# Patient Record
Sex: FEMALE | Employment: UNEMPLOYED | URBAN - METROPOLITAN AREA
[De-identification: names, ages, dates, MRNs, and addresses within clinical notes are randomized per-mention and may not be internally consistent; named-entity substitution may affect disease eponyms.]

---

## 2018-05-24 ENCOUNTER — TRANSFERRED RECORDS (OUTPATIENT)
Dept: HEALTH INFORMATION MANAGEMENT | Facility: CLINIC | Age: 43
End: 2018-05-24

## 2018-05-24 LAB
C TRACH DNA SPEC QL PROBE+SIG AMP: NEGATIVE
HPV ABSTRACT: NORMAL
N GONORRHOEA DNA SPEC QL PROBE+SIG AMP: NEGATIVE
PAP-ABSTRACT: NORMAL
SPECIMEN DESCRIP: NORMAL
SPECIMEN DESCRIPTION: NORMAL

## 2019-03-28 ENCOUNTER — APPOINTMENT (OUTPATIENT)
Dept: MRI IMAGING | Facility: CLINIC | Age: 44
End: 2019-03-28
Attending: EMERGENCY MEDICINE
Payer: COMMERCIAL

## 2019-03-28 ENCOUNTER — HOSPITAL ENCOUNTER (EMERGENCY)
Facility: CLINIC | Age: 44
Discharge: HOME | End: 2019-03-28
Attending: EMERGENCY MEDICINE | Admitting: EMERGENCY MEDICINE
Payer: COMMERCIAL

## 2019-03-28 VITALS
BODY MASS INDEX: 26.68 KG/M2 | HEART RATE: 81 BPM | SYSTOLIC BLOOD PRESSURE: 148 MMHG | WEIGHT: 145 LBS | RESPIRATION RATE: 16 BRPM | OXYGEN SATURATION: 99 % | TEMPERATURE: 98.8 F | HEIGHT: 62 IN | DIASTOLIC BLOOD PRESSURE: 95 MMHG

## 2019-03-28 DIAGNOSIS — G44.89 OTHER HEADACHE SYNDROME: ICD-10-CM

## 2019-03-28 LAB
ALBUMIN UR-MCNC: NEGATIVE MG/DL
ANION GAP SERPL CALCULATED.3IONS-SCNC: 8 MMOL/L (ref 3–14)
APPEARANCE UR: CLEAR
BASOPHILS # BLD AUTO: 0.1 10E9/L (ref 0–0.2)
BASOPHILS NFR BLD AUTO: 1.1 %
BILIRUB UR QL STRIP: NEGATIVE
BUN SERPL-MCNC: 10 MG/DL (ref 7–30)
CALCIUM SERPL-MCNC: 9.9 MG/DL (ref 8.5–10.1)
CHLORIDE SERPL-SCNC: 106 MMOL/L (ref 94–109)
CO2 SERPL-SCNC: 25 MMOL/L (ref 20–32)
COLOR UR AUTO: NORMAL
CREAT SERPL-MCNC: 0.66 MG/DL (ref 0.52–1.04)
DIFFERENTIAL METHOD BLD: ABNORMAL
EOSINOPHIL # BLD AUTO: 0 10E9/L (ref 0–0.7)
EOSINOPHIL NFR BLD AUTO: 0 %
ERYTHROCYTE [DISTWIDTH] IN BLOOD BY AUTOMATED COUNT: 13.5 % (ref 10–15)
GFR SERPL CREATININE-BSD FRML MDRD: >90 ML/MIN/{1.73_M2}
GLUCOSE SERPL-MCNC: 90 MG/DL (ref 70–99)
GLUCOSE UR STRIP-MCNC: NEGATIVE MG/DL
HCG UR QL: NEGATIVE
HCT VFR BLD AUTO: 35 % (ref 35–47)
HGB BLD-MCNC: 11.4 G/DL (ref 11.7–15.7)
HGB UR QL STRIP: NEGATIVE
IMM GRANULOCYTES # BLD: 0 10E9/L (ref 0–0.4)
IMM GRANULOCYTES NFR BLD: 0 %
KETONES UR STRIP-MCNC: NEGATIVE MG/DL
LEUKOCYTE ESTERASE UR QL STRIP: NEGATIVE
LYMPHOCYTES # BLD AUTO: 1.7 10E9/L (ref 0.8–5.3)
LYMPHOCYTES NFR BLD AUTO: 37.3 %
MCH RBC QN AUTO: 26.7 PG (ref 26.5–33)
MCHC RBC AUTO-ENTMCNC: 32.6 G/DL (ref 31.5–36.5)
MCV RBC AUTO: 82 FL (ref 78–100)
MONOCYTES # BLD AUTO: 0.2 10E9/L (ref 0–1.3)
MONOCYTES NFR BLD AUTO: 5.1 %
NEUTROPHILS # BLD AUTO: 2.6 10E9/L (ref 1.6–8.3)
NEUTROPHILS NFR BLD AUTO: 56.5 %
NITRATE UR QL: NEGATIVE
NRBC # BLD AUTO: 0 10*3/UL
NRBC BLD AUTO-RTO: 0 /100
PH UR STRIP: 6 PH (ref 5–7)
PLATELET # BLD AUTO: 324 10E9/L (ref 150–450)
POTASSIUM SERPL-SCNC: 3.6 MMOL/L (ref 3.4–5.3)
RBC # BLD AUTO: 4.27 10E12/L (ref 3.8–5.2)
SODIUM SERPL-SCNC: 139 MMOL/L (ref 133–144)
SOURCE: NORMAL
SP GR UR STRIP: 1.01 (ref 1–1.03)
UROBILINOGEN UR STRIP-MCNC: NORMAL MG/DL (ref 0–2)
WBC # BLD AUTO: 4.5 10E9/L (ref 4–11)

## 2019-03-28 PROCEDURE — 81025 URINE PREGNANCY TEST: CPT | Performed by: EMERGENCY MEDICINE

## 2019-03-28 PROCEDURE — 25500064 ZZH RX 255 OP 636: Performed by: EMERGENCY MEDICINE

## 2019-03-28 PROCEDURE — 96375 TX/PRO/DX INJ NEW DRUG ADDON: CPT

## 2019-03-28 PROCEDURE — 99285 EMERGENCY DEPT VISIT HI MDM: CPT | Mod: 25

## 2019-03-28 PROCEDURE — 70544 MR ANGIOGRAPHY HEAD W/O DYE: CPT | Mod: XS

## 2019-03-28 PROCEDURE — A9585 GADOBUTROL INJECTION: HCPCS | Performed by: EMERGENCY MEDICINE

## 2019-03-28 PROCEDURE — 81003 URINALYSIS AUTO W/O SCOPE: CPT | Performed by: EMERGENCY MEDICINE

## 2019-03-28 PROCEDURE — 96361 HYDRATE IV INFUSION ADD-ON: CPT

## 2019-03-28 PROCEDURE — 96374 THER/PROPH/DIAG INJ IV PUSH: CPT | Mod: 59

## 2019-03-28 PROCEDURE — 25000128 H RX IP 250 OP 636

## 2019-03-28 PROCEDURE — 80048 BASIC METABOLIC PNL TOTAL CA: CPT | Performed by: EMERGENCY MEDICINE

## 2019-03-28 PROCEDURE — 70549 MR ANGIOGRAPH NECK W/O&W/DYE: CPT

## 2019-03-28 PROCEDURE — 85025 COMPLETE CBC W/AUTO DIFF WBC: CPT | Performed by: EMERGENCY MEDICINE

## 2019-03-28 PROCEDURE — 25000128 H RX IP 250 OP 636: Performed by: EMERGENCY MEDICINE

## 2019-03-28 PROCEDURE — 70553 MRI BRAIN STEM W/O & W/DYE: CPT

## 2019-03-28 RX ORDER — SODIUM CHLORIDE 9 MG/ML
1000 INJECTION, SOLUTION INTRAVENOUS CONTINUOUS
Status: DISCONTINUED | OUTPATIENT
Start: 2019-03-28 | End: 2019-03-28 | Stop reason: HOSPADM

## 2019-03-28 RX ORDER — LORAZEPAM 2 MG/ML
INJECTION INTRAMUSCULAR
Status: COMPLETED
Start: 2019-03-28 | End: 2019-03-28

## 2019-03-28 RX ORDER — GADOBUTROL 604.72 MG/ML
10 INJECTION INTRAVENOUS ONCE
Status: COMPLETED | OUTPATIENT
Start: 2019-03-28 | End: 2019-03-28

## 2019-03-28 RX ORDER — LORAZEPAM 2 MG/ML
1 INJECTION INTRAMUSCULAR ONCE
Status: COMPLETED | OUTPATIENT
Start: 2019-03-28 | End: 2019-03-28

## 2019-03-28 RX ORDER — ACYCLOVIR 200 MG/1
60 CAPSULE ORAL ONCE
Status: DISCONTINUED | OUTPATIENT
Start: 2019-03-28 | End: 2019-03-28 | Stop reason: HOSPADM

## 2019-03-28 RX ORDER — ONDANSETRON 2 MG/ML
4 INJECTION INTRAMUSCULAR; INTRAVENOUS EVERY 30 MIN PRN
Status: DISCONTINUED | OUTPATIENT
Start: 2019-03-28 | End: 2019-03-28 | Stop reason: HOSPADM

## 2019-03-28 RX ORDER — HYDROMORPHONE HYDROCHLORIDE 1 MG/ML
0.5 INJECTION, SOLUTION INTRAMUSCULAR; INTRAVENOUS; SUBCUTANEOUS
Status: DISCONTINUED | OUTPATIENT
Start: 2019-03-28 | End: 2019-03-28 | Stop reason: HOSPADM

## 2019-03-28 RX ADMIN — LORAZEPAM 1 MG: 2 INJECTION INTRAMUSCULAR at 16:54

## 2019-03-28 RX ADMIN — MIDAZOLAM HYDROCHLORIDE 2 MG: 1 INJECTION, SOLUTION INTRAMUSCULAR; INTRAVENOUS at 17:20

## 2019-03-28 RX ADMIN — GADOBUTROL 10 ML: 604.72 INJECTION INTRAVENOUS at 17:29

## 2019-03-28 RX ADMIN — ONDANSETRON 4 MG: 2 INJECTION INTRAMUSCULAR; INTRAVENOUS at 16:28

## 2019-03-28 RX ADMIN — LORAZEPAM 1 MG: 2 INJECTION INTRAMUSCULAR; INTRAVENOUS at 16:54

## 2019-03-28 RX ADMIN — SODIUM CHLORIDE 1000 ML: 9 INJECTION, SOLUTION INTRAVENOUS at 16:27

## 2019-03-28 RX ADMIN — Medication 0.5 MG: at 16:29

## 2019-03-28 SDOH — HEALTH STABILITY: MENTAL HEALTH: HOW OFTEN DO YOU HAVE A DRINK CONTAINING ALCOHOL?: NEVER

## 2019-03-28 ASSESSMENT — ENCOUNTER SYMPTOMS
CHILLS: 1
FEVER: 0
VOMITING: 0
PHOTOPHOBIA: 0
NAUSEA: 1
WEAKNESS: 1
NECK STIFFNESS: 0
HEADACHES: 1

## 2019-03-28 ASSESSMENT — MIFFLIN-ST. JEOR: SCORE: 1255.97

## 2019-03-28 NOTE — ED PROVIDER NOTES
History     Chief Complaint:  Headache    HPI   Cristina Mcallister is a 44 year old female who presents with a headache. The patient states that around 2100 she started experiencing a frontal headache. Around 0300, the headache got much worse. The headache has remained severe, is rated a 10 out of 10 in severity, and the patient states it is the worst headache she has ever had. The headache is throbbing and right sided now. She also reports right arm and leg heaviness, slight weakness, and soreness which started last night. This morning she noticed that the right side of her neck seemed swollen and tender to the touch. She also notes blurry vision which is equal in both eyes, nausea, and chills. The patient denies any vomiting, photophobia, neck stiffness, or fevers. She denies a history of blood clots. The patient is not on hypertension medication due to having one kidney. She has no history of migraines.     Allergies:  No known allergies     Medications:    Diazepam  Ibuprofen  Venlafaxine    Past Medical History:    Congenital absence of one kidney  PTSD  HTN    Past Surgical History:    C section x3    Family History:    No past pertinent family history.    Social History:  Patient presents alone  Former smoker  Positive for alcohol use.   Marital Status:  Single      Review of Systems   Constitutional: Positive for chills. Negative for fever.   Eyes: Positive for visual disturbance. Negative for photophobia.   Gastrointestinal: Positive for nausea. Negative for vomiting.   Musculoskeletal: Negative for neck stiffness.   Neurological: Positive for weakness and headaches.   All other systems reviewed and are negative.        Physical Exam     Patient Vitals for the past 24 hrs:   BP Temp Temp src Pulse Heart Rate Resp SpO2 Height Weight   03/28/19 1924 (!) 148/95 -- -- -- -- -- -- -- --   03/28/19 1830 (!) 177/101 -- -- 81 -- -- -- -- --   03/28/19 1822 (!) 163/100 -- -- 84 -- -- 99 % -- --   03/28/19 1815 -- --  "-- 75 -- 16 98 % -- --   03/28/19 1800 -- -- -- 74 -- 16 97 % -- --   03/28/19 1745 -- -- -- 72 -- 16 98 % -- --   03/28/19 1730 -- -- -- 74 -- 20 97 % -- --   03/28/19 1715 -- -- -- 76 -- 20 97 % -- --   03/28/19 1700 (!) 182/104 -- -- 78 -- 20 98 % -- --   03/28/19 1600 (!) 178/103 -- -- 80 -- -- 99 % -- --   03/28/19 1523 (!) 181/103 98.8  F (37.1  C) Oral -- 84 18 99 % 1.575 m (5' 2\") 65.8 kg (145 lb)         Physical Exam     Nursing note and vitals reviewed.    Constitutional:  Appears well-developed and well-nourished, comfortable.    HENT:    Nose normal.  No discharge.      Oropharynx is clear and moist.  Eyes:    Conjunctivae are normal without injection. No lid droop.     Pupils are equal, round, and reactive to light.   Lymph:  No enlarged or tender cervical or submandibular lymph nodes.   Cardiovascular:  Normal rate, regular rhythm with normal S1 and S2.      Normal heart sounds and peripheral pulses 2+ and equal.       No murmur or lu. equal carotid pulses. no bruits.   Pulmonary:  Effort normal and breath sounds clear to auscultation bilaterally   No respiratory distress.  No stridor.     No wheezes. No rales.     GI:    Soft. No distension and no mass. No tenderness.      No rebound and no guarding. No flank pain.  No HSM.  Musculoskeletal:  Normal range of motion. No extremity deformity.     no tenderness.  No cyanosis. Swelling over right sternocleidomastoid, right above the clavicle.                                      Neck supple, no midline spinal tenderness.   Neurological:   GCS 15. NIH stroke scale score 0. O X 3.  No sensory deficit. Normal strength. Normal coordination. Normal finger to nose. Normal heel-knee-shin. No hand drift. No leg drift. Visual fields full. EOMs intact. No diplopia. No facial droop. No slurred speech. Mental status and memory normal. Comprehension and expression of speech is normal. Subjective difference in feeling when right side is palpated compared to left " side.   Skin:    Skin is warm and dry. No rash noted. No diaphoresis.      No erythema. No pallor.  No lesions.  Psychiatric:   Behavior is normal. Appropriate mood and affect.     Judgment and thought content normal.     Emergency Department Course   Imaging:  Radiographic findings were communicated with the patient who voiced understanding of the findings.  MRA Angiogram neck w/o and w contrast  1. No stenosis is seen at either carotid bifurcation.  2. The right vertebral artery is not identified on any of these  sequences. This is probably a developmental variant. The left  vertebral artery is a large vessel supplying the basilar artery.  Per radiology    MR Brain w/o and w contrast  1. No acute pathology is identified. No bleed, mass, or areas of acute  infarction are seen  2. Nonspecific, nonenhancing hyperintensities in the white matter both  hemispheres. These could be due to gliosis secondary to a previous  infectious or inflammatory process. They may also be secondary to  small vessel ischemic change. Hypertensive patients or diabetic  patients or more likely to have these type of changes. Patients with migraine-type headaches can also have these type of findings.  Per radiology    MR Head w/o contrast angiogram  Normal MR angiogram of the head.  Per radiology    Laboratory:  CBC: WBC: 4.5, HGB: 11.4 (L), PLT: 324  BMP: WNL (Creatinine: 0.66)(Glucose: 90)  HCG: negative  UA with micro: negative    Interventions:  1627 - NS 1L IV  1628 - Zofran 4 mg IV  1629 - Dilaudid 0.5 mg IV  1654 - Ativan 1 mg IV  1720 - Versed 2 mg IV     Emergency Department Course:  Nursing notes and vitals reviewed.  1600: I performed an exam of the patient as documented above.     1616: I discussed the case with Dr. Davis of stroke neuro regarding the patient.     1843: I rechecked the patient. Findings and plan explained to the Patient. Patient discharged home with instructions regarding supportive care, medications, and reasons  to return. The importance of close follow-up was reviewed.     Impression & Plan    Medical Decision Making:  Patient presents with headache and some subjective right-sided symptoms of change in sensation in the arms and leg feels heavy.  However I could not objectively notice any weakness, there is no leg drift or hand drift.  I talked with neuro and she recommended MRI and MRA which was obtained and came back essentially normal.  No evidence of aneurysm or bleed.  She was given Zofran and 1 dose of Dilaudid along with a milligram of Ativan and 2 milligrams of Versed for the MRI.  Her headache is completely gone at this time.  She does not have any symptoms.  It is possible this is a migraine.  With normal MRI and MRAs and resolution of her symptoms, I am comfortable discharging her home with follow-up in the clinic early next week.  She has 1 kidney and has a history of hypertension but her doctor took her off her medications.  Her blood pressure was up a little bit here probably due to her headache but if she continues to have hypertension she needs to be on medication to protect that kidney.  I have encouraged her to follow-up in the clinic and talk about this with her doctor.  If she gets worse at any time over the weekend I want her to return to the ER.  I do not believe that she needs a lumbar puncture at this time.    Diagnosis:    ICD-10-CM    1. Other headache syndrome G44.89        Disposition:  discharged to home  Home, rest, see your doctor in the clinic early next week for a recheck of your blood pressure as well as your headaches.  If you get a severe headache over the next 24-48 hours, or neurologic symptoms such as inability to use her right arm or a facial droop, you should return to the ER.    I, Kun Dickerson, am serving as a scribe on 3/28/2019 at 6:59 PM to personally document services performed by Any Sheth MD based on my observations and the provider's statements to me.       Kun  Jayla  3/28/2019    EMERGENCY DEPARTMENT       Any Sheth MD  03/28/19 1952

## 2019-03-28 NOTE — ED AVS SNAPSHOT
Emergency Department  [address] [facility] [address]  Phone:  [phone]  Fax:  [phone]                                    Cristina Mcallister   MRN: [id number]    Department:   Emergency Department   Date of Visit:  3/28/2019           After Visit Summary Signature Page    I have received my discharge instructions, and my questions have been answered. I have discussed any challenges I see with this plan with the nurse or doctor.    ..........................................................................................................................................  Patient/Patient Representative Signature      ..........................................................................................................................................  Patient Representative Print Name and Relationship to Patient    ..................................................               ................................................  Date                                   Time    ..........................................................................................................................................  Reviewed by Signature/Title    ...................................................              ..............................................  Date                                               Time          22EPIC Rev 08/18

## 2019-03-29 PROBLEM — Z22.358 ESBL ESCHERICHIA COLI CARRIER: Status: ACTIVE | Noted: 2017-09-22

## 2019-03-29 PROBLEM — N17.9 ACUTE RENAL FAILURE (H): Status: ACTIVE | Noted: 2018-02-04

## 2019-03-29 NOTE — DISCHARGE INSTRUCTIONS
Home, rest, see your doctor in the clinic early next week for a recheck of your blood pressure as well as your headaches.  If you get a severe headache over the next 24-48 hours, or neurologic symptoms such as inability to use her right arm or a facial droop, you should return to the ER.

## 2019-03-30 ENCOUNTER — HOSPITAL ENCOUNTER (EMERGENCY)
Facility: CLINIC | Age: 44
Discharge: HOME | End: 2019-03-30
Attending: EMERGENCY MEDICINE | Admitting: EMERGENCY MEDICINE
Payer: COMMERCIAL

## 2019-03-30 VITALS
HEIGHT: 62 IN | WEIGHT: 145 LBS | BODY MASS INDEX: 26.68 KG/M2 | RESPIRATION RATE: 16 BRPM | OXYGEN SATURATION: 98 % | DIASTOLIC BLOOD PRESSURE: 80 MMHG | TEMPERATURE: 97.2 F | SYSTOLIC BLOOD PRESSURE: 143 MMHG

## 2019-03-30 DIAGNOSIS — G89.29 CHRONIC BILATERAL LOW BACK PAIN WITH RIGHT-SIDED SCIATICA: ICD-10-CM

## 2019-03-30 DIAGNOSIS — M54.41 CHRONIC BILATERAL LOW BACK PAIN WITH RIGHT-SIDED SCIATICA: ICD-10-CM

## 2019-03-30 PROCEDURE — 99282 EMERGENCY DEPT VISIT SF MDM: CPT

## 2019-03-30 RX ORDER — LIDOCAINE 4 G/G
1 PATCH TOPICAL EVERY 24 HOURS
Qty: 12 PATCH | Refills: 0 | Status: SHIPPED | OUTPATIENT
Start: 2019-03-30

## 2019-03-30 RX ORDER — CYCLOBENZAPRINE HCL 10 MG
10 TABLET ORAL 3 TIMES DAILY PRN
Qty: 20 TABLET | Refills: 0 | Status: SHIPPED | OUTPATIENT
Start: 2019-03-30 | End: 2019-04-05

## 2019-03-30 ASSESSMENT — MIFFLIN-ST. JEOR: SCORE: 1255.97

## 2019-03-30 ASSESSMENT — ENCOUNTER SYMPTOMS
NUMBNESS: 0
BACK PAIN: 1
DIARRHEA: 1
WEAKNESS: 1

## 2019-03-30 NOTE — ED AVS SNAPSHOT
Emergency Department  [address] [place] [address]  Phone:  [phone]  Fax:  [phone]                                    Cristina Mcallister   MRN: [id number]    Department:   Emergency Department   Date of Visit:  3/30/2019           After Visit Summary Signature Page    I have received my discharge instructions, and my questions have been answered. I have discussed any challenges I see with this plan with the nurse or doctor.    ..........................................................................................................................................  Patient/Patient Representative Signature      ..........................................................................................................................................  Patient Representative Print Name and Relationship to Patient    ..................................................               ................................................  Date                                   Time    ..........................................................................................................................................  Reviewed by Signature/Title    ...................................................              ..............................................  Date                                               Time          22EPIC Rev 08/18

## 2019-03-30 NOTE — ED PROVIDER NOTES
"  History     Chief Complaint:  Back Pain    HPI   Cristina Mcallister is a 44 year old female with chronic low back pain who presents for evaluation of back pain. She reports that she has had mid to right sided back pain with radiculopathy since fall 2018. At the time of onset, she denies any known trauma or injuries. Since then, the pain has been constant and she has seen her primary physician, as well as the ED, multiple times with many imaging studies, most recently the MRI noted below. Today, she reports the back pain has worsened of late to the point where she cannot move. She states both legs have become \"heavy\" which worried her and prompted today's presentation. Here, she denies any numbness, bowel incontinence, or urinary symptoms. She has only been taking Tylenol for the pain as she cannot take ibuprofen with her kidney problems. She denies any history of diabetes. She has tried acupuncture and has an appointment with the [facility] pain clinic in 2 days. She has yet to see a spine specialist and is looking to establish care in the [place] system.     From [facility] - 2/15/2019:   MRI Lumbar Spine w/o contrast:   Mild degenerative disc disease at L4-5 and L5-S1 with moderate facet arthropathy. Bone marrow edema and perifacet edema, suggestive of an acute inflammatory phase of facet arthropathy, as per radiology.     Allergies:  Tramadol    Medications:    Amlodipine  Diazepam  Lisinopril  Venlafaxine    Past Medical History:    Congenital absence of a kidney  HTN  PTSD  Anxiety  Borderline personality disorder  Migraines     Past Surgical History:    C-sections x3    Family History:    No past pertinent family history.    Social History:  Marital Status:  Single [1]  Former smoker  Positive for alcohol use. Comment: Socially.      Review of Systems   Gastrointestinal: Positive for diarrhea.   Genitourinary: Negative.    Musculoskeletal: Positive for back pain.   Neurological: Positive for weakness. Negative for " numbness.   All other systems reviewed and are negative.    Physical Exam     Physical Exam  SKIN:  Warm, dry.    HEMATOLOGIC/IMMUNOLOGIC/LYMPHATIC:  No pallor.  No limb edema.  EYES:  Normal conjunctivae.  CARDIOVASCULAR:  Regular rate and rhythm.  Normal bilateral pedal pulses.  RESPIRATORY:  No respiratory distress, breath sounds equal and normal.  GASTROINTESTINAL:  Soft, nontender abdomen.  No mass or distension.  MUSCULOSKELETAL:  ROM of the torso and the lower limbs exacerbates/reproduces the lower back pain.  NEUROLOGIC:  Alert, conversant.  No motor or tactile sensory deficit of the lower extremities.  Equal normal patellar reflexes.  PSYCHIATRIC:  Normal mood.    Emergency Department Course   Emergency Department Course:  Nursing notes and vitals reviewed.   (1311) I performed an exam of the patient as documented above.      Findings and plan explained to the Patient. Patient discharged home with instructions regarding supportive care, medications, and reasons to return. The importance of close follow-up was reviewed. The patient was prescribed lidocaine patches and Flexeril.      I personally answered all related questions prior to discharge.      Impression & Plan      Medical Decision Making:  Cristina Mcallister is a 44 year old female who presents with chronic low back pain with a reassuring examination regarding spinal cord pathology. I prescribed lidocaine patches and Flexeril and recommended she follow-up with the scheduled pain clinic appointment in 2 days and, otherwise with Dr. Gambino if she wants to see a spine specialist outside that system.     Diagnosis:    ICD-10-CM    1. Chronic bilateral low back pain with right-sided sciatica M54.41     G89.29        Disposition:  discharged to home    Discharge Medications:     Medication List      Started    cyclobenzaprine 10 MG tablet  Commonly known as:  FLEXERIL  10 mg, Oral, 3 TIMES DAILY PRN     Lidocaine 4 % Patch  Commonly known as:  LIDOCARE  1  patch, Transdermal, EVERY 24 HOURS          Scribe Disclosure:  I, Cassie Marilin, am serving as a scribe on 3/30/2019 at 1:11 PM to personally document services performed by Ahsan Malik MD based on my observations and the provider's statements to me.       Cassie Gaston  3/30/2019    EMERGENCY DEPARTMENT       Ahsan Malik MD  03/30/19 1822

## 2019-04-01 PROBLEM — N17.9 ACUTE RENAL FAILURE (H): Status: RESOLVED | Noted: 2018-02-04 | Resolved: 2019-04-01

## 2024-07-05 ENCOUNTER — TRANSFERRED RECORDS (OUTPATIENT)
Dept: HEALTH INFORMATION MANAGEMENT | Facility: CLINIC | Age: 49
End: 2024-07-05

## 2024-07-31 ENCOUNTER — TRANSFERRED RECORDS (OUTPATIENT)
Dept: HEALTH INFORMATION MANAGEMENT | Facility: CLINIC | Age: 49
End: 2024-07-31

## 2025-01-07 ENCOUNTER — TELEPHONE (OUTPATIENT)
Dept: OTOLARYNGOLOGY | Facility: CLINIC | Age: 50
End: 2025-01-07
Payer: COMMERCIAL

## 2025-01-07 NOTE — TELEPHONE ENCOUNTER
Jessicar returned phone call to pt to assist with scheduling office visit with Dr. West. Jessicar spoke with pt and scheduled pt to be seen on Monday, 2/3/25 at 11:00 am with Dr. Monroe West MD at the [facility]. Pt verbalized understanding and is agreeable to date/time/location/reason for appointment.       Lisette Rojas RN on 1/7/2025 at 2:07 PM

## 2025-01-07 NOTE — TELEPHONE ENCOUNTER
[facility] Call Center    Phone Message    May a detailed message be left on voicemail: yes     Reason for Call: Other: Pt was told she would receive a call to schedule with Dr. West. Please call to disucss. Thank you     Action Taken: Message routed to:  [facility] ENT    Travel Screening: Not Applicable     Date of Service:

## 2025-08-18 ENCOUNTER — OFFICE VISIT (OUTPATIENT)
Dept: OTOLARYNGOLOGY | Facility: CLINIC | Age: 50
End: 2025-08-18
Payer: COMMERCIAL

## 2025-08-18 VITALS — HEIGHT: 62 IN | WEIGHT: 156.97 LBS | BODY MASS INDEX: 28.89 KG/M2

## 2025-08-18 DIAGNOSIS — T50.905A DRUG-INDUCED XEROSTOMIA: ICD-10-CM

## 2025-08-18 DIAGNOSIS — F43.10 PTSD (POST-TRAUMATIC STRESS DISORDER): ICD-10-CM

## 2025-08-18 DIAGNOSIS — F41.0 PANIC DISORDER WITHOUT AGORAPHOBIA: ICD-10-CM

## 2025-08-18 DIAGNOSIS — Z98.890 STATUS POST NASAL SURGERY: ICD-10-CM

## 2025-08-18 DIAGNOSIS — G47.33 MODERATE OBSTRUCTIVE SLEEP APNEA: Primary | ICD-10-CM

## 2025-08-18 DIAGNOSIS — K11.7 DRUG-INDUCED XEROSTOMIA: ICD-10-CM

## 2025-08-18 DIAGNOSIS — Z78.9 INTOLERANCE OF CONTINUOUS POSITIVE AIRWAY PRESSURE (CPAP) VENTILATION: ICD-10-CM

## 2025-08-18 DIAGNOSIS — J35.1 TONSILLAR HYPERTROPHY: ICD-10-CM

## 2025-08-18 PROCEDURE — G2211 COMPLEX E/M VISIT ADD ON: HCPCS | Performed by: OTOLARYNGOLOGY

## 2025-08-18 PROCEDURE — 99204 OFFICE O/P NEW MOD 45 MIN: CPT | Performed by: OTOLARYNGOLOGY

## 2025-08-18 RX ORDER — ECHINACEA PURPUREA EXTRACT 125 MG
1 TABLET ORAL DAILY PRN
COMMUNITY
Start: 2024-03-26

## 2025-08-18 RX ORDER — ERGOCALCIFEROL 1.25 MG/1
50000 CAPSULE, LIQUID FILLED ORAL WEEKLY
COMMUNITY
Start: 2025-02-17

## 2025-08-18 RX ORDER — AMOXICILLIN 250 MG
1 CAPSULE ORAL
COMMUNITY
Start: 2025-07-07

## 2025-08-18 RX ORDER — CARBOXYMETHYLCELLULOSE SODIUM 10 MG/ML
1 GEL OPHTHALMIC 4 TIMES DAILY
COMMUNITY
Start: 2024-09-11

## 2025-08-18 RX ORDER — ESTRADIOL 0.1 MG/G
1 CREAM VAGINAL
COMMUNITY
Start: 2025-04-02

## 2025-08-18 RX ORDER — AMOXICILLIN 500 MG
1 CAPSULE ORAL DAILY
COMMUNITY
Start: 2025-05-07

## 2025-08-18 RX ORDER — TRAZODONE HYDROCHLORIDE 50 MG/1
25 TABLET ORAL AT BEDTIME
COMMUNITY

## 2025-08-18 RX ORDER — CALCIUM CARBONATE 500(1250)
1 TABLET ORAL
COMMUNITY
Start: 2025-04-14

## 2025-08-18 RX ORDER — LEVOTHYROXINE SODIUM 100 UG/1
100 TABLET ORAL DAILY
COMMUNITY
Start: 2024-08-30

## 2025-08-18 RX ORDER — SUMATRIPTAN 50 MG/1
TABLET, FILM COATED ORAL
COMMUNITY
Start: 2024-04-25

## 2025-08-18 RX ORDER — DOXEPIN HYDROCHLORIDE 25 MG/1
25 CAPSULE ORAL DAILY
COMMUNITY
Start: 2024-01-31

## 2025-08-18 RX ORDER — IRON POLYSACCHARIDE COMPLEX 150 MG
150 CAPSULE ORAL
COMMUNITY
Start: 2025-04-21

## 2025-08-18 RX ORDER — PANTOPRAZOLE SODIUM 40 MG/1
40 TABLET, DELAYED RELEASE ORAL DAILY
COMMUNITY
Start: 2024-04-22

## 2025-08-18 RX ORDER — CETIRIZINE HYDROCHLORIDE 10 MG/1
10 TABLET ORAL DAILY PRN
COMMUNITY
Start: 2024-02-09

## 2025-08-18 RX ORDER — ATORVASTATIN CALCIUM 40 MG/1
40 TABLET, FILM COATED ORAL DAILY
COMMUNITY
Start: 2025-05-07

## 2025-08-18 RX ORDER — ACETAMINOPHEN 325 MG/1
325 TABLET ORAL EVERY 4 HOURS PRN
COMMUNITY

## 2025-08-18 RX ORDER — CLONIDINE 0.3 MG/24H
1 PATCH, EXTENDED RELEASE TRANSDERMAL WEEKLY
COMMUNITY
Start: 2024-03-13

## 2025-08-18 RX ORDER — PRAZOSIN HYDROCHLORIDE 1 MG/1
1 CAPSULE ORAL AT BEDTIME
COMMUNITY
Start: 2024-03-14

## 2025-08-18 RX ORDER — FLUTICASONE PROPIONATE 50 MCG
1 SPRAY, SUSPENSION (ML) NASAL
COMMUNITY
Start: 2024-02-09

## 2025-08-18 RX ORDER — LOPERAMIDE HYDROCHLORIDE 2 MG/1
2 CAPSULE ORAL
COMMUNITY
Start: 2024-10-08

## 2025-08-18 RX ORDER — OMEPRAZOLE 20 MG/1
20 CAPSULE, DELAYED RELEASE ORAL
COMMUNITY
Start: 2025-04-25